# Patient Record
Sex: FEMALE | Race: WHITE | NOT HISPANIC OR LATINO | Employment: OTHER | URBAN - METROPOLITAN AREA
[De-identification: names, ages, dates, MRNs, and addresses within clinical notes are randomized per-mention and may not be internally consistent; named-entity substitution may affect disease eponyms.]

---

## 2023-01-01 ENCOUNTER — NURSING HOME VISIT (OUTPATIENT)
Dept: POST ACUTE CARE | Facility: EXTERNAL LOCATION | Age: 78
End: 2023-01-01
Payer: MEDICARE

## 2023-01-01 ENCOUNTER — NURSING HOME VISIT (OUTPATIENT)
Dept: POST ACUTE CARE | Facility: EXTERNAL LOCATION | Age: 78
End: 2023-01-01

## 2023-01-01 DIAGNOSIS — F02.80 ALZHEIMER DISEASE (MULTI): Primary | ICD-10-CM

## 2023-01-01 DIAGNOSIS — F41.9 ANXIETY: ICD-10-CM

## 2023-01-01 DIAGNOSIS — G30.9 ALZHEIMER DISEASE (MULTI): Primary | ICD-10-CM
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
## 2023-01-01 DIAGNOSIS — U07.1 COVID-19: ICD-10-CM

## 2023-01-01 DIAGNOSIS — I10 HYPERTENSION, UNSPECIFIED TYPE: ICD-10-CM

## 2023-01-01 DIAGNOSIS — U07.1 COVID-19: Primary | ICD-10-CM

## 2023-01-01 PROCEDURE — 99308 SBSQ NF CARE LOW MDM 20: CPT | Performed by: INTERNAL MEDICINE

## 2023-01-01 PROCEDURE — 99308 SBSQ NF CARE LOW MDM 20: CPT | Performed by: NURSE PRACTITIONER

## 2023-01-01 ASSESSMENT — ENCOUNTER SYMPTOMS
WHEEZING: 0
CONFUSION: 1
FEVER: 0
GASTROINTESTINAL NEGATIVE: 1
SHORTNESS OF BREATH: 0
COUGH: 0

## 2023-03-22 PROBLEM — G30.9 ALZHEIMER DISEASE (MULTI): Status: ACTIVE | Noted: 2023-01-01

## 2023-03-22 PROBLEM — I10 HYPERTENSION: Status: ACTIVE | Noted: 2023-01-01

## 2023-03-22 PROBLEM — F02.80 ALZHEIMER DISEASE (MULTI): Status: ACTIVE | Noted: 2023-01-01

## 2023-03-22 PROBLEM — F41.9 ANXIETY: Status: ACTIVE | Noted: 2023-01-01

## 2023-03-22 NOTE — LETTER
Patient: Caitlin Costa  : 1945    Encounter Date: 2023    Pt is doing fine , no complaint  GA: Comfortable, no distress  ROS: No SOB  Medications reviewed  Head: Normal  Neck: Soft  Heart: Regular  Lungs: Clear  Abdomen: soft    Impression: clinically doing fine, continue current management    Problem List Items Addressed This Visit          Nervous    Alzheimer disease (CMS/HCC) - Primary       Circulatory    Hypertension       Other    Anxiety          Electronically Signed By: Johnathan Tran MD   3/22/23  6:07 PM

## 2023-04-19 NOTE — LETTER
Patient: Caitlin Costa  : 1945    Encounter Date: 2023    Pt was seen in the NH,Pt is doing fine , no complaint  General appearance: Comfortable, no distress  ROS: No SOB  Medications reviewed  Head: Normal  Neck: Soft  Heart: Regular  Lungs: Clear  Abdomen: soft    Impression: clinically doing fine, continue current management    Problem List Items Addressed This Visit          Nervous    Alzheimer disease (CMS/HCC) - Primary       Other    Anxiety          Electronically Signed By: Johnathan Tran MD   23  4:46 PM

## 2023-04-19 NOTE — PROGRESS NOTES
Pt was seen in the NH,Pt is doing fine , no complaint  General appearance: Comfortable, no distress  ROS: No SOB  Medications reviewed  Head: Normal  Neck: Soft  Heart: Regular  Lungs: Clear  Abdomen: soft    Impression: clinically doing fine, continue current management    Problem List Items Addressed This Visit          Nervous    Alzheimer disease (CMS/HCC) - Primary       Other    Anxiety

## 2023-05-03 NOTE — LETTER
Patient: Caitlin Costa  : 1945    Encounter Date: 2023    Subjective  Patient ID: Caitlin Costa is a 77 y.o. female who presents for No chief complaint on file..  76 yo female at [facility] with recent falls.  Resident tested positive for covid 19.          Review of Systems   Constitutional:  Negative for fever.   HENT: Negative.     Respiratory:  Negative for cough, shortness of breath and wheezing.    Cardiovascular:  Negative for leg swelling.   Gastrointestinal: Negative.    Psychiatric/Behavioral:  Positive for confusion.        Objective  Physical Exam  Constitutional:       General: She is not in acute distress.     Comments: Resident in bed resting with eyes open.  Pleasant and cooperative. Denies any pain.    HENT:      Nose: No rhinorrhea.   Cardiovascular:      Rate and Rhythm: Normal rate and regular rhythm.   Pulmonary:      Effort: Pulmonary effort is normal.      Breath sounds: Normal breath sounds.   Abdominal:      General: Bowel sounds are normal.   Musculoskeletal:      Right lower leg: No edema.      Left lower leg: No edema.   Neurological:      Mental Status: She is alert.   Psychiatric:      Comments: Pleasant and cooperative         No current outpatient medications on file.     Assessment/Plan  Problem List Items Addressed This Visit          Nervous    Alzheimer disease (CMS/[facility]) - Primary       Infectious/Inflammatory    COVID-19       Other    Falls   Resident is asymptomatic with covid 19; protocols in place.  No apparent injuries from fall.  Will review labs upon return.  Continue to monitor.             Electronically Signed By: ELLA Smith   23  7:41 AM
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
## 2023-05-04 PROBLEM — U07.1 COVID-19: Status: ACTIVE | Noted: 2023-01-01

## 2023-05-04 NOTE — PROGRESS NOTES
Subjective   Patient ID: Caitlin Costa is a 77 y.o. female who presents for No chief complaint on file..  76 yo female at [facility] with recent falls.  Resident tested positive for covid 19.          Review of Systems   Constitutional:  Negative for fever.   HENT: Negative.     Respiratory:  Negative for cough, shortness of breath and wheezing.    Cardiovascular:  Negative for leg swelling.   Gastrointestinal: Negative.    Psychiatric/Behavioral:  Positive for confusion.        Objective   Physical Exam  Constitutional:       General: She is not in acute distress.     Comments: Resident in bed resting with eyes open.  Pleasant and cooperative. Denies any pain.    HENT:      Nose: No rhinorrhea.   Cardiovascular:      Rate and Rhythm: Normal rate and regular rhythm.   Pulmonary:      Effort: Pulmonary effort is normal.      Breath sounds: Normal breath sounds.   Abdominal:      General: Bowel sounds are normal.   Musculoskeletal:      Right lower leg: No edema.      Left lower leg: No edema.   Neurological:      Mental Status: She is alert.   Psychiatric:      Comments: Pleasant and cooperative         No current outpatient medications on file.     Assessment/Plan   Problem List Items Addressed This Visit          Nervous    Alzheimer disease (CMS/[facility]) - Primary       Infectious/Inflammatory    COVID-19       Other    Falls   Resident is asymptomatic with covid 19; protocols in place.  No apparent injuries from fall.  Will review labs upon return.  Continue to monitor.

## 2023-05-05 NOTE — LETTER
Patient: Caitlin Costa  : 1945    Encounter Date: 2023    Pt was seen in the NH,Pt is doing fine , no complaint  General appearance: Comfortable, no distress  ROS: No SOB  Medications reviewed  Head: Normal  Neck: Soft  Heart: Regular  Lungs: Clear  Abdomen: soft    Impression: clinically doing fine, continue current management    Problem List Items Addressed This Visit          Infectious/Inflammatory    COVID-19 - Primary          Electronically Signed By: Johnathan Tran MD   23  7:58 PM

## 2023-05-05 NOTE — PROGRESS NOTES
Pt was seen in the NH,Pt is doing fine , no complaint  General appearance: Comfortable, no distress  ROS: No SOB  Medications reviewed  Head: Normal  Neck: Soft  Heart: Regular  Lungs: Clear  Abdomen: soft    Impression: clinically doing fine, continue current management    Problem List Items Addressed This Visit          Infectious/Inflammatory    COVID-19 - Primary

## 2023-05-11 NOTE — LETTER
Patient: Caitlin Costa  : 1945    Encounter Date: 2023    Pt was seen in the NH,Pt is doing fine , no complaint  General appearance: Comfortable, no distress  ROS: No SOB  Medications reviewed  Head: Normal  Neck: Soft  Heart: Regular  Lungs: Clear  Abdomen: soft    Impression: clinically doing fine, continue current management    Problem List Items Addressed This Visit          Infectious/Inflammatory    COVID-19 - Primary          Electronically Signed By: Johnathan Tran MD   23 10:12 PM

## 2023-05-22 NOTE — LETTER
Patient: Caitlin Costa  : 1945    Encounter Date: 2023    Pt was seen in the NH,Pt is doing fine , no complaint  General appearance: Comfortable, no distress  ROS: No SOB  Medications reviewed  Head: Normal  Neck: Soft  Heart: Regular  Lungs: Clear  Abdomen: soft    Impression: clinically doing fine, continue current management    Problem List Items Addressed This Visit          Nervous    Alzheimer disease (CMS/HCC) - Primary       Other    Anxiety          Electronically Signed By: Johnathan Tran MD   23  5:05 PM

## 2023-06-22 NOTE — LETTER
Patient: Caitlin Costa  : 1945    Encounter Date: 2023    Pt was seen in the NH,Pt is in her usual state , no complaint  General appearance: Comfortable, no distress  ROS: No SOB  Medications reviewed  Head: Normal  Neck: Soft  Heart: Regular  Lungs: Clear  Abdomen: soft    Impression: clinically doing fine, continue current management    Problem List Items Addressed This Visit       Alzheimer disease (CMS/HCC) - Primary    Anxiety          Electronically Signed By: Johnathan Tran MD   23  7:51 PM

## 2023-06-22 NOTE — PROGRESS NOTES
Pt was seen in the NH,Pt is in her usual state , no complaint  General appearance: Comfortable, no distress  ROS: No SOB  Medications reviewed  Head: Normal  Neck: Soft  Heart: Regular  Lungs: Clear  Abdomen: soft    Impression: clinically doing fine, continue current management    Problem List Items Addressed This Visit       Alzheimer disease (CMS/HCC) - Primary    Anxiety

## 2023-07-20 NOTE — LETTER
Patient: Caitlin Costa  : 1945    Encounter Date: 2023    Pt was seen in the NH,Pt is in usual state , no complaint  General appearance: Comfortable, no distress  ROS: No SOB  Medications reviewed  Head: Normal  Neck: Soft  Heart: Regular  Lungs: Clear  Abdomen: soft    Impression: clinically doing fine, continue current management    Problem List Items Addressed This Visit       Alzheimer disease (CMS/[facility]) - Primary    Anxiety          Electronically Signed By: Johnathan Tran MD   23  5:15 PM

## 2023-07-20 NOTE — PROGRESS NOTES
Pt was seen in the NH,Pt is in usual state , no complaint  General appearance: Comfortable, no distress  ROS: No SOB  Medications reviewed  Head: Normal  Neck: Soft  Heart: Regular  Lungs: Clear  Abdomen: soft    Impression: clinically doing fine, continue current management    Problem List Items Addressed This Visit       Alzheimer disease (CMS/HCC) - Primary    Anxiety

## 2024-02-02 NOTE — PROGRESS NOTES
Pt is doing fine , no complaint  GA: Comfortable, no distress  ROS: No SOB  Medications reviewed  Head: Normal  Neck: Soft  Heart: Regular  Lungs: Clear  Abdomen: soft    Impression: clinically doing fine, continue current management    Problem List Items Addressed This Visit          Nervous    Alzheimer disease (CMS/HCC) - Primary       Circulatory    Hypertension       Other    Anxiety         yesterday